# Patient Record
Sex: FEMALE | Race: WHITE | NOT HISPANIC OR LATINO | URBAN - METROPOLITAN AREA
[De-identification: names, ages, dates, MRNs, and addresses within clinical notes are randomized per-mention and may not be internally consistent; named-entity substitution may affect disease eponyms.]

---

## 2017-01-24 ENCOUNTER — APPOINTMENT (RX ONLY)
Dept: URBAN - METROPOLITAN AREA CLINIC 127 | Facility: CLINIC | Age: 52
Setting detail: DERMATOLOGY
End: 2017-01-24

## 2017-01-24 DIAGNOSIS — Z41.9 ENCOUNTER FOR PROCEDURE FOR PURPOSES OTHER THAN REMEDYING HEALTH STATE, UNSPECIFIED: ICD-10-CM

## 2017-01-24 PROCEDURE — ? TREATMENT REGIMEN

## 2017-01-24 PROCEDURE — ? LASER HAIR REMOVAL

## 2017-01-24 NOTE — PROCEDURE: TREATMENT REGIMEN
Plan: suggested clarisonic with two speeds or more- also discussed radiesse vs voluma, one syringe per cheek, and the special for 2/3/17 for voluma. Patient is getting rhinoplasty with Dr Nair in May. Briefly discussed PSR, and chemical peels.
Detail Level: Zone

## 2017-01-24 NOTE — PROCEDURE: LASER HAIR REMOVAL
Fluence (Will Not Render If 0): 88
Fluence (Will Not Render If 0): 50
Treatment Number: 0
Total Pulses: 536
Detail Level: Zone
Cooling Override: Outside 1064 chiller 7 degrees
Spot Size: 8 mm
External Cooling Fan Speed: 5
Post-Procedure Care: Immediate endpoint: perifollicular erythema and edema. Ice applied. Post care reviewed with patient.
Cooling: chill tip
Pulse Duration: 30 ms
Treatment Number: 2
Laser Type: Nd:Yag 1064nm
Fluence (Will Not Render If 0): 20
Render Post-Care In The Note: Yes
Total Pulses: 234
Post-Care Instructions: I reviewed with the patient in detail post-care instructions.
Pre-Procedure: Prior to proceeding the treatment areas were cleaned and all present put on their eye protection.
Pulse Duration: 40 ms
Cooling: DCD setting
Cooling Override: 8.2 1064 cooler
Spot Size: 1.5 mm
Consent: Written consent obtained, risks reviewed including but not limited to crusting, scabbing, blistering, scarring, darker or lighter pigmentary change, paradoxical hair regrowth, incomplete removal of hair and infection. Verbal
Spot Size: 3 mm
Pulse Duration: 0.45 ms

## 2017-03-07 ENCOUNTER — APPOINTMENT (RX ONLY)
Dept: URBAN - METROPOLITAN AREA CLINIC 127 | Facility: CLINIC | Age: 52
Setting detail: DERMATOLOGY
End: 2017-03-07

## 2017-03-07 DIAGNOSIS — Z41.9 ENCOUNTER FOR PROCEDURE FOR PURPOSES OTHER THAN REMEDYING HEALTH STATE, UNSPECIFIED: ICD-10-CM

## 2017-03-07 PROCEDURE — ? LASER HAIR REMOVAL

## 2017-03-07 ASSESSMENT — LOCATION DETAILED DESCRIPTION DERM: LOCATION DETAILED: SUBMENTAL CHIN

## 2017-03-07 ASSESSMENT — LOCATION SIMPLE DESCRIPTION DERM: LOCATION SIMPLE: SUBMENTAL CHIN

## 2017-03-07 ASSESSMENT — LOCATION ZONE DERM: LOCATION ZONE: FACE

## 2017-04-18 ENCOUNTER — APPOINTMENT (RX ONLY)
Dept: URBAN - METROPOLITAN AREA CLINIC 127 | Facility: CLINIC | Age: 52
Setting detail: DERMATOLOGY
End: 2017-04-18

## 2017-04-18 DIAGNOSIS — Z41.9 ENCOUNTER FOR PROCEDURE FOR PURPOSES OTHER THAN REMEDYING HEALTH STATE, UNSPECIFIED: ICD-10-CM

## 2017-04-18 PROCEDURE — ? LASER HAIR REMOVAL

## 2017-04-18 NOTE — PROCEDURE: LASER HAIR REMOVAL
Laser Type: Nd:Yag 1064nm
Fluence (Will Not Render If 0): 20
Spot Size: 3 mm
Detail Level: Zone
Number Of Prepaid Treatments (Will Not Render If 0): 0
Total Pulses: 780
Pulse Duration: 30 ms
Post-Care Instructions: I reviewed with the patient in detail post-care instructions.
Cooling Override: 8.2 1064 cooler
Pre-Procedure: Prior to proceeding the treatment areas were cleaned and all present put on their eye protection.
Post-Procedure Care: Immediate endpoint: perifollicular erythema and edema. Ice applied. Post care reviewed with patient.
Pulse Duration: 0.45 ms
External Cooling Fan Speed: 2
Cooling: DCD setting
Consent: Written consent obtained, risks reviewed including but not limited to crusting, scabbing, blistering, scarring, darker or lighter pigmentary change, paradoxical hair regrowth, incomplete removal of hair and infection. Verbal
Total Pulses: 186
Render Post-Care In The Note: Yes
Fluence (Will Not Render If 0): 75
Spot Size: 5 mm
Spot Size: 1.5 mm
Cooling: chill tip
Fluence (Will Not Render If 0): 90

## 2017-06-26 ENCOUNTER — APPOINTMENT (RX ONLY)
Dept: URBAN - METROPOLITAN AREA CLINIC 127 | Facility: CLINIC | Age: 52
Setting detail: DERMATOLOGY
End: 2017-06-26

## 2017-06-26 DIAGNOSIS — Z41.9 ENCOUNTER FOR PROCEDURE FOR PURPOSES OTHER THAN REMEDYING HEALTH STATE, UNSPECIFIED: ICD-10-CM

## 2017-06-26 PROCEDURE — ? LASER HAIR REMOVAL

## 2017-06-26 ASSESSMENT — LOCATION SIMPLE DESCRIPTION DERM: LOCATION SIMPLE: SUBMENTAL CHIN

## 2017-06-26 ASSESSMENT — LOCATION DETAILED DESCRIPTION DERM: LOCATION DETAILED: SUBMENTAL CHIN

## 2017-06-26 ASSESSMENT — LOCATION ZONE DERM: LOCATION ZONE: FACE

## 2017-06-26 NOTE — PROCEDURE: LASER HAIR REMOVAL
Pre-Procedure: Prior to proceeding the treatment areas were cleaned and all present put on their eye protection.
Number Of Prepaid Treatments (Will Not Render If 0): 0
Cooling Override: 8.2 1064 cooler
External Cooling Fan Speed: 2
Post-Procedure Care: Immediate endpoint: perifollicular erythema and edema. Ice applied. Post care reviewed with patient.
Cooling: DCD setting
Total Pulses: 158
Post-Care Instructions: I reviewed with the patient in detail post-care instructions.
Spot Size: 5 mm
Spot Size: 1.5 mm
Pulse Duration: 0.45 ms
Pulse Duration: 30 ms
Fluence (Will Not Render If 0): 20
Spot Size: 3 mm
Render Post-Care In The Note: Yes
Consent: Written consent obtained, risks reviewed including but not limited to crusting, scabbing, blistering, scarring, darker or lighter pigmentary change, paradoxical hair regrowth, incomplete removal of hair and infection. Verbal
Cooling: chill tip
Laser Type: Nd:Yag 1064nm
Fluence (Will Not Render If 0): 75
Detail Level: Zone
Fluence (Will Not Render If 0): 90
Total Pulses: 780

## 2017-08-15 ENCOUNTER — APPOINTMENT (RX ONLY)
Dept: URBAN - METROPOLITAN AREA CLINIC 127 | Facility: CLINIC | Age: 52
Setting detail: DERMATOLOGY
End: 2017-08-15

## 2017-08-15 DIAGNOSIS — Z41.9 ENCOUNTER FOR PROCEDURE FOR PURPOSES OTHER THAN REMEDYING HEALTH STATE, UNSPECIFIED: ICD-10-CM

## 2017-08-15 PROCEDURE — ? OTHER (COSMETIC)

## 2017-08-15 PROCEDURE — ? COSMETIC CONSULTATION: BOTULINUM TOXIN

## 2017-08-15 PROCEDURE — ? MEDICAL CONSULTATION: FILLERS

## 2017-08-15 ASSESSMENT — LOCATION DETAILED DESCRIPTION DERM
LOCATION DETAILED: LEFT UPPER CUTANEOUS LIP
LOCATION DETAILED: RIGHT CENTRAL MALAR CHEEK
LOCATION DETAILED: LEFT INFERIOR CENTRAL MALAR CHEEK
LOCATION DETAILED: LEFT CENTRAL MALAR CHEEK
LOCATION DETAILED: RIGHT INFERIOR CENTRAL MALAR CHEEK
LOCATION DETAILED: RIGHT NASOLABIAL FOLD

## 2017-08-15 ASSESSMENT — LOCATION SIMPLE DESCRIPTION DERM
LOCATION SIMPLE: LEFT CHEEK
LOCATION SIMPLE: LEFT LIP
LOCATION SIMPLE: RIGHT CHEEK
LOCATION SIMPLE: RIGHT LIP

## 2017-08-15 ASSESSMENT — LOCATION ZONE DERM
LOCATION ZONE: LIP
LOCATION ZONE: FACE

## 2017-08-18 ENCOUNTER — APPOINTMENT (RX ONLY)
Dept: URBAN - METROPOLITAN AREA CLINIC 127 | Facility: CLINIC | Age: 52
Setting detail: DERMATOLOGY
End: 2017-08-18

## 2017-08-18 DIAGNOSIS — Z41.9 ENCOUNTER FOR PROCEDURE FOR PURPOSES OTHER THAN REMEDYING HEALTH STATE, UNSPECIFIED: ICD-10-CM

## 2017-08-18 PROCEDURE — ? OTHER

## 2017-08-18 PROCEDURE — ? FILLERS

## 2017-08-18 PROCEDURE — ? BOTOX

## 2017-08-18 NOTE — PROCEDURE: BOTOX
Masseter Units: 0
Glabellar Complex Units: 20
Dilution (U/0.1 Cc): 4
Additional Area 1 Location: above right & left eyebrow
Additional Area 4 Location: Chin
Expiration Date (Month Year): 4/19
Consent: Verbal
Detail Level: Zone
Lot #: [id number]
Forehead Units: 16
Additional Area 2 Location: Right axilla
Additional Area 3 Location: Bunny lines

## 2017-08-18 NOTE — PROCEDURE: FILLERS
Nasolabial Folds Filler Volume In Cc: 0
Cheeks Filler Volume In Cc: 1
Post-Care Instructions: Patient instructed to apply ice to reduce swelling. Pt was also instructed to avoid aspirin.
Lot #: [id number]
Expiration Date (Month Year): 10/24/18
Filler: Voluma
Expiration Date (Month Year): 10/24/2018
Use Map Statement For Sites (Optional): Yes
Lot #: [id number]
Expiration Date (Month Year): 7/22/17
Additional Area 1 Location: Lips
Additional Area 1 Location: oral comm
Detail Level: Zone
Consent: Written consent obtained. Risks include but not limited to bruising, beading, irregular texture, ulceration, infection, allergic reaction, scar formation, incomplete augmentation, temporary nature, procedural pain.
Anesthesia Type: 1% lidocaine without epinephrine

## 2017-08-18 NOTE — PROCEDURE: OTHER
Other (Free Text): Per Dr. Mercado if pt returns within two weeks for 2 more voluma for cheeks we can give her promotional price
Detail Level: Simple
Note Text (......Xxx Chief Complaint.): This diagnosis correlates with the acne

## 2017-09-08 ENCOUNTER — APPOINTMENT (RX ONLY)
Dept: URBAN - METROPOLITAN AREA CLINIC 127 | Facility: CLINIC | Age: 52
Setting detail: DERMATOLOGY
End: 2017-09-08

## 2017-09-08 DIAGNOSIS — Z41.9 ENCOUNTER FOR PROCEDURE FOR PURPOSES OTHER THAN REMEDYING HEALTH STATE, UNSPECIFIED: ICD-10-CM

## 2017-09-08 PROCEDURE — ? BOTOX

## 2017-09-08 NOTE — PROCEDURE: BOTOX
Detail Level: Zone
Glabellar Complex Units: 0
Additional Area 2 Units: 2
Additional Area 2 Location: left eyebrow
Additional Area 1 Location: above right eyebrow
Additional Area 4 Location: Chin
Dilution (U/0.1 Cc): 4
Additional Area 3 Location: Bunny lines
Expiration Date (Month Year): 4/19
Consent: Verbal
Lot #: [id number]

## 2023-12-22 NOTE — PROCEDURE: LASER HAIR REMOVAL
Treatment Number: 0
Spot Size: 3 mm
Fluence (Will Not Render If 0): 20
fair plus
Fluence (Will Not Render If 0): 50
Spot Size: 1.5 mm
Total Pulses: 536
Cooling: DCD setting
Fluence (Will Not Render If 0): 89
Render Post-Care In The Note: Yes
External Cooling Fan Speed: 2
Spot Size: 8 mm
Pulse Duration: 0.45 ms
Consent: Written consent obtained, risks reviewed including but not limited to crusting, scabbing, blistering, scarring, darker or lighter pigmentary change, paradoxical hair regrowth, incomplete removal of hair and infection. Verbal
Post-Procedure Care: Immediate endpoint: perifollicular erythema and edema. Ice applied. Post care reviewed with patient.
Laser Type: Nd:Yag 1064nm
Pulse Duration: 40 ms
Cooling: chill tip
Pulse Duration: 30 ms
Detail Level: Zone
Price (Use Numbers Only, No Special Characters Or $): 100
Cooling Override: 8.2 1064 cooler
Post-Care Instructions: I reviewed with the patient in detail post-care instructions.
Pre-Procedure: Prior to proceeding the treatment areas were cleaned and all present put on their eye protection.